# Patient Record
Sex: FEMALE | Race: WHITE | URBAN - METROPOLITAN AREA
[De-identification: names, ages, dates, MRNs, and addresses within clinical notes are randomized per-mention and may not be internally consistent; named-entity substitution may affect disease eponyms.]

---
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
## 2022-08-04 ENCOUNTER — NURSE TRIAGE (OUTPATIENT)
Dept: NURSING | Facility: CLINIC | Age: 22
End: 2022-08-04

## 2022-08-04 NOTE — TELEPHONE ENCOUNTER
Phuong Son has changed her last name to Mary.     Phuong gave writer permission to speak with her mother about her health.      Get's care at the [facility] in [facility].   OB provider is Dr. Ben Benavides [phone].    Is 8 weeks postpartum has depression.  Has been taking Wellbutrin.  This is not helping.      Calling to see if Phuong could be started on a new medication, IV Zulresso.  Mother states it is only given in certain medical centers.  The [facility] happens to be such a site.    Writer call Dr. Benavides's office and left him a message about phuong. Wanting to try  Zulresso for her depression.   Dr. Benavides will call patient at home tomorrow.    Cherelle York RN, [facility] Triage Nurse Advisor      Reason for Disposition    [1] Caller has URGENT medicine question about med that PCP or specialist prescribed AND [2] triager unable to answer question    Protocols used: MEDICATION QUESTION CALL-A-